# Patient Record
Sex: MALE | Race: WHITE | Employment: OTHER | URBAN - METROPOLITAN AREA
[De-identification: names, ages, dates, MRNs, and addresses within clinical notes are randomized per-mention and may not be internally consistent; named-entity substitution may affect disease eponyms.]

---

## 2020-02-19 ENCOUNTER — TELEPHONE (OUTPATIENT)
Dept: CARDIOLOGY CLINIC | Age: 80
End: 2020-02-19

## 2020-02-26 ENCOUNTER — TELEPHONE (OUTPATIENT)
Dept: CARDIOLOGY CLINIC | Age: 80
End: 2020-02-26

## 2020-03-02 ENCOUNTER — TELEPHONE (OUTPATIENT)
Dept: CARDIOLOGY CLINIC | Age: 80
End: 2020-03-02

## 2020-03-11 ENCOUNTER — PROCEDURE VISIT (OUTPATIENT)
Dept: CARDIOLOGY CLINIC | Age: 80
End: 2020-03-11
Payer: MEDICARE

## 2020-03-11 LAB
LV EF: 58 %
LVEF MODALITY: NORMAL

## 2020-03-11 PROCEDURE — 93306 TTE W/DOPPLER COMPLETE: CPT | Performed by: INTERNAL MEDICINE

## 2024-07-29 LAB
ALBUMIN: 4.1 G/DL
ALP BLD-CCNC: 85 U/L
ALT SERPL-CCNC: 11 U/L
ANION GAP SERPL CALCULATED.3IONS-SCNC: 0.6 MMOL/L
AST SERPL-CCNC: 14 U/L
BASOPHILS ABSOLUTE: 0.1 /ΜL
BASOPHILS RELATIVE PERCENT: 0.6 %
BILIRUB SERPL-MCNC: 0.6 MG/DL (ref 0.1–1.4)
BUN BLDV-MCNC: 16 MG/DL
CALCIUM SERPL-MCNC: 9.1 MG/DL
CHLORIDE BLD-SCNC: 105 MMOL/L
CO2: 28 MMOL/L
CREAT SERPL-MCNC: 0.7 MG/DL
EOSINOPHILS ABSOLUTE: 0.2 /ΜL
EOSINOPHILS RELATIVE PERCENT: 2.6 %
GFR, ESTIMATED: 90
GLUCOSE BLD-MCNC: 94 MG/DL
HCT VFR BLD CALC: 42.5 % (ref 41–53)
HEMOGLOBIN: 13.8 G/DL (ref 13.5–17.5)
LYMPHOCYTES ABSOLUTE: 22.3 /ΜL
LYMPHOCYTES RELATIVE PERCENT: 22.3 %
MCH RBC QN AUTO: 29.8 PG
MCHC RBC AUTO-ENTMCNC: 32.5 G/DL
MCV RBC AUTO: 91.8 FL
MONOCYTES ABSOLUTE: 0.7 /ΜL
MONOCYTES RELATIVE PERCENT: 8.8 %
NEUTROPHILS ABSOLUTE: 5.2 /ΜL
NEUTROPHILS RELATIVE PERCENT: 65.3 %
PLATELET # BLD: 303 K/ΜL
PMV BLD AUTO: 10.9 FL
POTASSIUM SERPL-SCNC: 4.6 MMOL/L
RBC # BLD: 4.63 10^6/ΜL
SODIUM BLD-SCNC: 141 MMOL/L
TOTAL PROTEIN: 6.2 G/DL (ref 6.4–8.2)
WBC # BLD: 8 10^3/ML

## 2024-08-08 ENCOUNTER — INITIAL CONSULT (OUTPATIENT)
Dept: CARDIOLOGY CLINIC | Age: 84
End: 2024-08-08
Payer: MEDICARE

## 2024-08-08 VITALS
DIASTOLIC BLOOD PRESSURE: 66 MMHG | BODY MASS INDEX: 35.81 KG/M2 | WEIGHT: 279 LBS | SYSTOLIC BLOOD PRESSURE: 130 MMHG | HEART RATE: 62 BPM | HEIGHT: 74 IN

## 2024-08-08 DIAGNOSIS — M79.89 LEG SWELLING: ICD-10-CM

## 2024-08-08 DIAGNOSIS — R06.02 SOB (SHORTNESS OF BREATH) ON EXERTION: ICD-10-CM

## 2024-08-08 DIAGNOSIS — I50.33 ACUTE ON CHRONIC DIASTOLIC CONGESTIVE HEART FAILURE (HCC): ICD-10-CM

## 2024-08-08 DIAGNOSIS — I73.9 CLAUDICATION (HCC): Primary | ICD-10-CM

## 2024-08-08 DIAGNOSIS — R06.02 SHORTNESS OF BREATH: ICD-10-CM

## 2024-08-08 DIAGNOSIS — M79.604 PAIN OF RIGHT LOWER EXTREMITY: ICD-10-CM

## 2024-08-08 DIAGNOSIS — Z76.89 ESTABLISHING CARE WITH NEW DOCTOR, ENCOUNTER FOR: ICD-10-CM

## 2024-08-08 DIAGNOSIS — I25.10 ASCVD (ARTERIOSCLEROTIC CARDIOVASCULAR DISEASE): ICD-10-CM

## 2024-08-08 DIAGNOSIS — I10 PRIMARY HYPERTENSION: ICD-10-CM

## 2024-08-08 PROCEDURE — 93000 ELECTROCARDIOGRAM COMPLETE: CPT | Performed by: INTERNAL MEDICINE

## 2024-08-08 PROCEDURE — G8417 CALC BMI ABV UP PARAM F/U: HCPCS | Performed by: INTERNAL MEDICINE

## 2024-08-08 PROCEDURE — 99204 OFFICE O/P NEW MOD 45 MIN: CPT | Performed by: INTERNAL MEDICINE

## 2024-08-08 PROCEDURE — 1036F TOBACCO NON-USER: CPT | Performed by: INTERNAL MEDICINE

## 2024-08-08 PROCEDURE — G8427 DOCREV CUR MEDS BY ELIG CLIN: HCPCS | Performed by: INTERNAL MEDICINE

## 2024-08-08 PROCEDURE — 1123F ACP DISCUSS/DSCN MKR DOCD: CPT | Performed by: INTERNAL MEDICINE

## 2024-08-08 RX ORDER — LOSARTAN POTASSIUM AND HYDROCHLOROTHIAZIDE 25; 100 MG/1; MG/1
1 TABLET ORAL DAILY
COMMUNITY

## 2024-08-08 RX ORDER — FUROSEMIDE 40 MG/1
40 TABLET ORAL DAILY
COMMUNITY

## 2024-08-08 RX ORDER — AMLODIPINE BESYLATE 2.5 MG/1
5 TABLET ORAL DAILY
COMMUNITY
End: 2024-08-08 | Stop reason: ALTCHOICE

## 2024-08-08 NOTE — ASSESSMENT & PLAN NOTE
Has bilateral swelling but right leg is worse than the left check for venous reflux, check BNP get echo start Lasix 40 mg daily

## 2024-08-08 NOTE — ASSESSMENT & PLAN NOTE
Blood pressure is well-controlled today he is not regularly taking his medication compliance is a concern check echo may have diastolic dysfunction contributing to leg swelling

## 2024-08-08 NOTE — PROGRESS NOTES
appropriate       Medical decision making and Data review:  DATA:  No results found for: \"TROPONINT\"  BNP:  No results found for: \"PROBNP\"  PT/INR:  No results found for: \"PTINR\"  No results found for: \"LABA1C\"  No results found for: \"CHOL\", \"TRIG\", \"HDL\", \"LDLDIRECT\"  Lab Results   Component Value Date    ALT 11 07/29/2024    AST 14 07/29/2024     No results for input(s): \"WBC\", \"HGB\", \"HCT\", \"MCV\", \"PLT\" in the last 72 hours.  TSH: No results found for: \"TSH\"  Lab Results   Component Value Date    AST 14 07/29/2024    ALT 11 07/29/2024    BILITOT 0.6 07/29/2024    ALKPHOS 85 07/29/2024     No results found for: \"PROBNP\"  No results found for: \"LABA1C\"  Lab Results   Component Value Date    WBC 8.0 07/29/2024    HGB 13.8 07/29/2024    HCT 42.5 07/29/2024     07/29/2024     All labs, medications and tests reviewed by myself including data and history from outside source , patient and available family .  Assessment & Plan:      1. Claudication (HCC)    2. Establishing care with new doctor, encounter for    3. Leg swelling    4. SOB (shortness of breath) on exertion    5. Pain of right lower extremity    6. Acute on chronic diastolic congestive heart failure (HCC)    7. Shortness of breath    8. ASCVD (arteriosclerotic cardiovascular disease)    9. Primary hypertension         ASCVD (arteriosclerotic cardiovascular disease)  History of stents many years ago to put shortness of breath with exertion get stress test    Pain of right lower extremity  Concerning for possible claudication?  Check arterial Doppler    Leg swelling   Has bilateral swelling but right leg is worse than the left check for venous reflux, check BNP get echo start Lasix 40 mg daily    Primary hypertension  Blood pressure is well-controlled today he is not regularly taking his medication compliance is a concern check echo may have diastolic dysfunction contributing to leg swelling     Dyslipidemia :  All available lab work was reviewed.

## 2024-09-06 ENCOUNTER — TELEPHONE (OUTPATIENT)
Dept: CARDIOLOGY CLINIC | Age: 84
End: 2024-09-06

## 2024-09-06 NOTE — TELEPHONE ENCOUNTER
Left Ventricle: Normal left ventricular systolic function with a visually estimated EF of 55 - 60%. Left ventricle size is normal. Moderately increased wall thickness. Normal wall motion. Grade I diastolic dysfunction with normal LAP.    Aortic Valve: Sclerotic but non-stenotic arotic valve. Moderate regurgitation. AV PHT is 394.0 ms. Vena contracta measures 0.3 cm.    Mitral Valve: Annular calcification. Mild regurgitation.    Tricuspid Valve: Physiologically normal regurgitation. The estimated RVSP is 21 mmHg.    Pulmonic Valve: Mild regurgitation.    Aorta: Normal sized abdominal aorta. Mildly dilated aortic root. Ao root diameter is 4.1 cm. Moderately dilated ascending aorta. Ao ascending diameter is 4.6 cm.    Pericardium: No pericardial effusion.    Image quality is fair.         LV perfusion is normal. There is no evidence of inducible ischemia.    ECG: The stress ECG was negative for ischemia.    Stress Test: A pharmacological stress test was performed using regadenoson (Lexiscan). Hemodynamics are adequate for diagnosis. Blood pressure demonstrated a hypertensive response and heart rate demonstrated a normal response to stress. The patient's heart rate recovery was normal.    Ejection fraction was 65%.     Spoke with pt regarding results , pt voiced understanding

## 2024-10-07 ENCOUNTER — OFFICE VISIT (OUTPATIENT)
Dept: CARDIOLOGY CLINIC | Age: 84
End: 2024-10-07
Payer: MEDICARE

## 2024-10-07 VITALS — HEIGHT: 74 IN | WEIGHT: 267 LBS | BODY MASS INDEX: 34.27 KG/M2

## 2024-10-07 DIAGNOSIS — M79.604 PAIN OF RIGHT LOWER EXTREMITY: ICD-10-CM

## 2024-10-07 DIAGNOSIS — I77.810 ASCENDING AORTA DILATATION (HCC): ICD-10-CM

## 2024-10-07 DIAGNOSIS — I25.10 ASCVD (ARTERIOSCLEROTIC CARDIOVASCULAR DISEASE): Primary | ICD-10-CM

## 2024-10-07 DIAGNOSIS — I73.9 CLAUDICATION (HCC): ICD-10-CM

## 2024-10-07 DIAGNOSIS — M79.89 LEG SWELLING: ICD-10-CM

## 2024-10-07 DIAGNOSIS — R06.02 SOB (SHORTNESS OF BREATH) ON EXERTION: ICD-10-CM

## 2024-10-07 DIAGNOSIS — I35.1 NONRHEUMATIC AORTIC VALVE INSUFFICIENCY: ICD-10-CM

## 2024-10-07 DIAGNOSIS — I10 PRIMARY HYPERTENSION: ICD-10-CM

## 2024-10-07 DIAGNOSIS — R01.1 HEART MURMUR: ICD-10-CM

## 2024-10-07 PROCEDURE — G8417 CALC BMI ABV UP PARAM F/U: HCPCS | Performed by: INTERNAL MEDICINE

## 2024-10-07 PROCEDURE — 1036F TOBACCO NON-USER: CPT | Performed by: INTERNAL MEDICINE

## 2024-10-07 PROCEDURE — 1123F ACP DISCUSS/DSCN MKR DOCD: CPT | Performed by: INTERNAL MEDICINE

## 2024-10-07 PROCEDURE — G8484 FLU IMMUNIZE NO ADMIN: HCPCS | Performed by: INTERNAL MEDICINE

## 2024-10-07 PROCEDURE — 99214 OFFICE O/P EST MOD 30 MIN: CPT | Performed by: INTERNAL MEDICINE

## 2024-10-07 PROCEDURE — G8427 DOCREV CUR MEDS BY ELIG CLIN: HCPCS | Performed by: INTERNAL MEDICINE

## 2024-10-07 RX ORDER — ASPIRIN 81 MG/1
81 TABLET ORAL DAILY
COMMUNITY

## 2024-10-07 NOTE — PROGRESS NOTES
CARDIOLOGY  NOTE    Chief Complaint: Leg pain Atherosclerotic cardiovascular disease    HPI:   Myron is a 84 y.o. year old who has Past medical history as noted below.  Comes in for evaluation due to concerns for bilateral lower extremity swelling and pain specially in his right leg when he ambulates and can walk a certain distance but has to stop due to pain.    He says back pain got better after steroids and leg pain improved   He also noticed that his legs are more swollen which gets better when he rests at night and gets swollen as the day progresses.  He has history of coronary artery disease s/p stents longstanding history of hypertension but was noncompliant with taking medication even now he is taking only one of his blood pressure meds.  He says he is short of breath when he exerts or walks he was out of his meds for last 1 month but after he started taking 2 of them he is taking Lasix      Current Outpatient Medications   Medication Sig Dispense Refill    aspirin 81 MG EC tablet Take 1 tablet by mouth daily      furosemide (LASIX) 40 MG tablet Take 1 tablet by mouth daily      losartan-hydroCHLOROthiazide (HYZAAR) 100-25 MG per tablet Take 1 tablet by mouth daily       No current facility-administered medications for this visit.       Allergies:   Promethazine    Patient History:  Past Medical History:   Diagnosis Date    H/O echocardiogram 4/29/2014    EF55% mild MR, TR, AR    H/O echocardiogram 03/11/2020    EF 55-60%, Mild AR.     No past surgical history on file.  No family history on file.  Social History     Tobacco Use    Smoking status: Never    Smokeless tobacco: Never   Substance Use Topics    Alcohol use: Not Currently     Comment: Caffiene - 1 cup of coffee/day        Review of Systems:   Constitutional: No Fever or Weight Loss   Eyes: No Decreased Vision  ENT: No Headaches, Hearing Loss or Vertigo  Cardiovascular: as per note above   Respiratory: No